# Patient Record
Sex: MALE | Race: WHITE | NOT HISPANIC OR LATINO | Employment: UNEMPLOYED | URBAN - METROPOLITAN AREA
[De-identification: names, ages, dates, MRNs, and addresses within clinical notes are randomized per-mention and may not be internally consistent; named-entity substitution may affect disease eponyms.]

---

## 2019-05-26 ENCOUNTER — HOSPITAL ENCOUNTER (OUTPATIENT)
Dept: TELEMEDICINE | Facility: HOSPITAL | Age: 59
Discharge: HOME | End: 2019-05-26

## 2019-05-26 PROCEDURE — 99499 UNLISTED E&M SERVICE: CPT | Mod: GT,,, | Performed by: PSYCHIATRY & NEUROLOGY

## 2019-05-26 PROCEDURE — 99499 NO LOS: ICD-10-PCS | Mod: GT,,, | Performed by: PSYCHIATRY & NEUROLOGY

## 2019-05-26 NOTE — CONSULTS
[facility]  Vascular Neurology  Comprehensive Stroke Center  Tele-Consultation Note      Consults    Consulting Provider: MIRACLE MENA  Current Providers  No providers found    Patient Location: [facility]  Spoke hospital nurse at bedside with patient assisting consultant.     Patient information was obtained from caregiver / friend.         Assessment/Plan:     STROKE DOCUMENTATION     Acute Stroke Times:        NIH Scale:        Modified Glidden    Adry Coma Scale:    ABCD2 Score:    [id number] Score:   HAS -BLED Score:   ICH Score:   Hunt & Martinez Classification:       Diagnoses:   No new Assessment & Plan notes have been filed under this hospital service since the last note was generated.  Service: Vascular Neurology      There were no vitals taken for this visit.  Alteplase Eligible?: Yes  Alteplase Recommendation: Alteplase not recommended due to minimal deficit   Possible Interventional Revascularization Candidate? No; No ischemic penumbra    Disposition Recommendation: do not transfer    Subjective:     History of Present Illness:  59 y./o male LKN at 0900 when he developed slurred speech and pain and weakness in LLE.  Cart failed during history so I spoke to Dr. Mena and completed audio only consult.    No new subjective & objective note has been filed under this hospital service since the last note was generated.      Recommended the emergency room physician to have a brief discussion with the patient and/or family if available regarding the risks and benefits of treatment, and to briefly document the occurrence of that discussion in his clinical encounter note.     The attending portion of this evaluation, treatment, and documentation was performed per Jocelin Lundberg MD via audio only.    Billing code: No LOS (audio only consult)    In your opinion, this was a: Tier 1 Van Negative    Consult  End Time: 0957     Jocelin Lundberg MD  Comprehensive Stroke Center  Vascular Neurology   [facility]

## 2019-05-26 NOTE — HPI
59 y./o male LKN at 0900 when he developed slurred speech and pain and weakness in LLE.  Cart failed during history so I spoke to Dr. Price and completed audio only consult.

## 2019-11-05 ENCOUNTER — HISTORICAL (OUTPATIENT)
Dept: LAB | Facility: HOSPITAL | Age: 59
End: 2019-11-05

## 2019-11-05 LAB
ABS NEUT (OLG): 3.3 X10(3)/MCL (ref 2.1–9.2)
BUN SERPL-MCNC: 43 MG/DL (ref 7–18)
CALCIUM SERPL-MCNC: 8.5 MG/DL (ref 8.5–10.1)
CHLORIDE SERPL-SCNC: 101 MMOL/L (ref 98–107)
CO2 SERPL-SCNC: 27 MMOL/L (ref 21–32)
CREAT SERPL-MCNC: 2.57 MG/DL (ref 0.7–1.3)
CREAT/UREA NIT SERPL: 17
ERYTHROCYTE [DISTWIDTH] IN BLOOD BY AUTOMATED COUNT: 12.5 % (ref 11.5–17)
GLUCOSE SERPL-MCNC: 100 MG/DL (ref 74–106)
HCT VFR BLD AUTO: 37.8 % (ref 42–52)
HGB BLD-MCNC: 12.8 GM/DL (ref 14–18)
MCH RBC QN AUTO: 32.7 PG (ref 27–31)
MCHC RBC AUTO-ENTMCNC: 33.9 GM/DL (ref 33–36)
MCV RBC AUTO: 96.4 FL (ref 80–94)
NRBC BLD AUTO-RTO: 0 % (ref 0–0.2)
PLATELET # BLD AUTO: 210 X10(3)/MCL (ref 130–400)
PMV BLD AUTO: 8.7 FL (ref 7.4–10.4)
POTASSIUM SERPL-SCNC: 4.7 MMOL/L (ref 3.5–5.1)
RBC # BLD AUTO: 3.92 X10(6)/MCL (ref 4.7–6.1)
SODIUM SERPL-SCNC: 132 MMOL/L (ref 136–145)
WBC # SPEC AUTO: 4.7 X10(3)/MCL (ref 4.5–11.5)

## 2019-11-06 ENCOUNTER — HOSPITAL ENCOUNTER (OUTPATIENT)
Dept: MEDSURG UNIT | Facility: HOSPITAL | Age: 59
End: 2019-11-07
Attending: GENERAL PRACTICE | Admitting: INTERNAL MEDICINE

## 2019-11-07 LAB
ABS NEUT (OLG): 6.14 X10(3)/MCL (ref 2.1–9.2)
ALBUMIN SERPL-MCNC: 3.5 GM/DL (ref 3.4–5)
ALBUMIN/GLOB SERPL: 1.2 {RATIO}
ALP SERPL-CCNC: 63 UNIT/L (ref 50–136)
ALT SERPL-CCNC: 44 UNIT/L (ref 12–78)
AST SERPL-CCNC: 38 UNIT/L (ref 15–37)
BASOPHILS # BLD AUTO: 0 X10(3)/MCL (ref 0–0.2)
BASOPHILS NFR BLD AUTO: 0 %
BILIRUB SERPL-MCNC: 0.6 MG/DL (ref 0.2–1)
BILIRUBIN DIRECT+TOT PNL SERPL-MCNC: 0.1 MG/DL (ref 0–0.2)
BILIRUBIN DIRECT+TOT PNL SERPL-MCNC: 0.5 MG/DL (ref 0–0.8)
BUN SERPL-MCNC: 39 MG/DL (ref 7–18)
CALCIUM SERPL-MCNC: 8.4 MG/DL (ref 8.5–10.1)
CHLORIDE SERPL-SCNC: 111 MMOL/L (ref 98–107)
CO2 SERPL-SCNC: 26 MMOL/L (ref 21–32)
CREAT SERPL-MCNC: 2.67 MG/DL (ref 0.7–1.3)
EOSINOPHIL # BLD AUTO: 0 X10(3)/MCL (ref 0–0.9)
EOSINOPHIL NFR BLD AUTO: 0 %
ERYTHROCYTE [DISTWIDTH] IN BLOOD BY AUTOMATED COUNT: 13 % (ref 11.5–17)
GLOBULIN SER-MCNC: 2.9 GM/DL (ref 2.4–3.5)
GLUCOSE SERPL-MCNC: 100 MG/DL (ref 74–106)
HCT VFR BLD AUTO: 35.9 % (ref 42–52)
HGB BLD-MCNC: 11.4 GM/DL (ref 14–18)
LYMPHOCYTES # BLD AUTO: 0.6 X10(3)/MCL (ref 0.6–4.6)
LYMPHOCYTES NFR BLD AUTO: 8 %
MCH RBC QN AUTO: 31.5 PG (ref 27–31)
MCHC RBC AUTO-ENTMCNC: 31.8 GM/DL (ref 33–36)
MCV RBC AUTO: 99.2 FL (ref 80–94)
MONOCYTES # BLD AUTO: 0.6 X10(3)/MCL (ref 0.1–1.3)
MONOCYTES NFR BLD AUTO: 8 %
NEUTROPHILS # BLD AUTO: 6.14 X10(3)/MCL (ref 2.1–9.2)
NEUTROPHILS NFR BLD AUTO: 84 %
PLATELET # BLD AUTO: 110 X10(3)/MCL (ref 130–400)
PMV BLD AUTO: 8.9 FL (ref 9.4–12.4)
POTASSIUM SERPL-SCNC: 4.2 MMOL/L (ref 3.5–5.1)
PROT SERPL-MCNC: 6.4 GM/DL (ref 6.4–8.2)
RBC # BLD AUTO: 3.62 X10(6)/MCL (ref 4.7–6.1)
SODIUM SERPL-SCNC: 143 MMOL/L (ref 136–145)
WBC # SPEC AUTO: 7.4 X10(3)/MCL (ref 4.5–11.5)

## 2020-02-10 PROBLEM — Z98.62 PERIPHERAL VASCULAR ANGIOPLASTY STATUS: Status: ACTIVE | Noted: 2020-02-10

## 2020-05-22 PROBLEM — R56.9 SEIZURE: Status: ACTIVE | Noted: 2019-06-19

## 2020-05-22 PROBLEM — R00.1 BRADYCARDIA: Status: ACTIVE | Noted: 2019-06-19

## 2020-05-22 PROBLEM — I10 ESSENTIAL HYPERTENSION: Status: ACTIVE | Noted: 2019-06-19

## 2020-05-22 PROBLEM — Z99.2 HEMODIALYSIS PATIENT: Status: ACTIVE | Noted: 2020-05-22

## 2020-05-22 PROBLEM — E78.2 MIXED HYPERLIPIDEMIA: Status: ACTIVE | Noted: 2019-06-19

## 2020-05-22 PROBLEM — N25.81 HYPERPARATHYROIDISM, SECONDARY RENAL: Status: ACTIVE | Noted: 2020-05-22

## 2020-05-22 PROBLEM — N18.4 CHRONIC KIDNEY DISEASE (CKD), STAGE IV (SEVERE): Status: ACTIVE | Noted: 2020-05-22

## 2020-05-22 PROBLEM — I77.89 OTHER SPECIFIED DISORDERS OF ARTERIES AND ARTERIOLES: Status: ACTIVE | Noted: 2019-06-19

## 2020-05-22 PROBLEM — I95.3 HEMODIALYSIS-ASSOCIATED HYPOTENSION: Status: ACTIVE | Noted: 2020-05-22

## 2020-05-22 PROBLEM — R29.898 WEAKNESS OF BOTH LOWER EXTREMITIES: Status: ACTIVE | Noted: 2019-06-19

## 2020-05-22 PROBLEM — Z86.718 HISTORY OF DVT (DEEP VEIN THROMBOSIS): Status: ACTIVE | Noted: 2019-06-19

## 2020-05-22 PROBLEM — R42 DIZZINESS: Status: ACTIVE | Noted: 2019-06-19

## 2020-05-22 PROBLEM — R55 NEAR SYNCOPE: Status: ACTIVE | Noted: 2020-05-22

## 2020-05-22 PROBLEM — F79 INTELLECTUAL DISABILITY: Status: ACTIVE | Noted: 2019-06-19

## 2020-05-22 PROBLEM — N17.9 ACUTE RENAL FAILURE: Status: ACTIVE | Noted: 2019-11-11

## 2020-05-22 PROBLEM — R60.0 LOWER EXTREMITY EDEMA: Status: ACTIVE | Noted: 2020-05-22

## 2020-08-28 PROBLEM — R42 DIZZINESS: Status: RESOLVED | Noted: 2019-06-19 | Resolved: 2020-08-28

## 2020-08-28 PROBLEM — R60.0 LOWER EXTREMITY EDEMA: Status: RESOLVED | Noted: 2020-05-22 | Resolved: 2020-08-28

## 2020-08-28 PROBLEM — R55 NEAR SYNCOPE: Status: RESOLVED | Noted: 2020-05-22 | Resolved: 2020-08-28

## 2020-08-28 PROBLEM — I10 ESSENTIAL HYPERTENSION: Status: RESOLVED | Noted: 2019-06-19 | Resolved: 2020-08-28

## 2021-05-12 ENCOUNTER — PATIENT MESSAGE (OUTPATIENT)
Dept: RESEARCH | Facility: HOSPITAL | Age: 61
End: 2021-05-12